# Patient Record
Sex: FEMALE | Race: BLACK OR AFRICAN AMERICAN | Employment: OTHER | URBAN - METROPOLITAN AREA
[De-identification: names, ages, dates, MRNs, and addresses within clinical notes are randomized per-mention and may not be internally consistent; named-entity substitution may affect disease eponyms.]

---

## 2018-08-26 ENCOUNTER — HOSPITAL ENCOUNTER (EMERGENCY)
Facility: CLINIC | Age: 83
Discharge: HOME | End: 2018-08-26
Attending: NURSE PRACTITIONER | Admitting: NURSE PRACTITIONER
Payer: MEDICARE

## 2018-08-26 ENCOUNTER — APPOINTMENT (OUTPATIENT)
Dept: GENERAL RADIOLOGY | Facility: CLINIC | Age: 83
End: 2018-08-26
Attending: NURSE PRACTITIONER
Payer: MEDICARE

## 2018-08-26 VITALS
RESPIRATION RATE: 16 BRPM | TEMPERATURE: 98.5 F | HEART RATE: 75 BPM | DIASTOLIC BLOOD PRESSURE: 65 MMHG | OXYGEN SATURATION: 98 % | SYSTOLIC BLOOD PRESSURE: 148 MMHG

## 2018-08-26 DIAGNOSIS — S61.012A THUMB LACERATION, LEFT, INITIAL ENCOUNTER: ICD-10-CM

## 2018-08-26 PROCEDURE — 73140 X-RAY EXAM OF FINGER(S): CPT | Mod: LT

## 2018-08-26 PROCEDURE — 99283 EMERGENCY DEPT VISIT LOW MDM: CPT

## 2018-08-26 ASSESSMENT — ENCOUNTER SYMPTOMS: WOUND: 1

## 2018-08-26 NOTE — ED PROVIDER NOTES
History     Chief Complaint:  Laceration    The history is provided by a relative. No  was used.      Chantale Mishra is a right-handed 83 year old female with a history of diabetes who presents for evaluation of a laceration to her left thumb. The patient's granddaughter, serving as interpretor, reports that the patient dropped a glass of juice yesterday morning, shattering it on the ground. As the glass broke, the patient attempted to maintain her grasp, resulting in a ~3 cm cut to the palmar aspect of her left thumb. Most recent tetanus was 2015. Patient denies other complaint.    Allergies:  No known drug allergies     Medications:    Amlodipine  Glipizide  Losartan  Prilosec    Past Medical History:    Diabetes    Past Surgical History:    The patient does not have any past pertinent medical history.     Family History:    History reviewed. No pertinent family history.      Social History:  Presents with granddaughter   Tobacco use: Not on file   Alcohol use: Not on file   PCP: Geovanni Hopkins    Marital Status:  Single     Review of Systems   Skin: Positive for wound.   All other systems reviewed and are negative.    Physical Exam     Patient Vitals for the past 24 hrs:   BP Temp Pulse Heart Rate Resp SpO2   08/26/18 1620 148/65 - - 70 16 98 %   08/26/18 1451 157/71 98.5  F (36.9  C) 75 75 16 97 %        Physical Exam  Eyes: Pupils equally round  HENT: Head is normal in appearance. Moist mucus membranes.  Cardiovascular: Normal color of mucus membranes  Respiratory: Normal respiratory effort  Musculoskeletal: No asymmetry  Skin: Normal, without rash. Left thumb palmar side 3 cm laceration   Lymphatic: No edema  Neurologic: Normal sensation. Unable to perform ROM due to pain. No exposed tendon.  Psychiatric: Normal affect.    Emergency Department Course     Procedure:  The simple superficial wound was irrigated with normal saline before Steri-Strips were applied. No local anaesthetic was  used.    Imaging:  Radiographic findings were communicated with the patient and family who voiced understanding of the findings.    XR Fingers, 2-3 views, left:  IMPRESSION: No evidence for fracture, dislocation or significant degenerative change of the left thumb. No radiopaque foreign bodies identified.    Imaging independently reviewed and agree with radiologist interpretation.      Emergency Department Course:  Past medical records, nursing notes, and vitals reviewed.  1510: I performed an exam of the patient and obtained history, as documented above.    The patient was sent for a x-ray while in the emergency department, findings above.     1600: I rechecked the patient. Steri-Strips were applied to the wound. Findings and plan explained to the Patient. Patient discharged home with instructions regarding supportive care, medications, and reasons to return. The importance of close follow-up was reviewed.      Impression & Plan      Medical Decision Making:  Chantale Mishra is a 83 year old female who presents with her granddaughter for evaluation of a laceration to her left thumb sustained by broken glass. Laceration repaired using steri strip given that this occurred yesterday morning (~20 hours ago). Tetanus status addressed this visit, is up to date as of 2015.  Discussed potential for infection including necrosis, nerve damage, infection/sepsis. Recommend have laceration rechecked in 2 days by PCP or return here if unable to obtain appointment. Instructed to watch for signs of infection including drainage, fever, swelling, pain. Should return right away. Appropriate dressing applied. Is stable to discharge home with after care instructions.     Diagnosis:    ICD-10-CM   1. Thumb laceration, left, initial encounter S61.012A       Disposition:  Discharged to home with plan as outlined.       Scribe Disclosure:  Kentrell SUE, am serving as a scribe at 3:07 PM on 8/26/2018 to document services personally  performed by Delfina Galo, LUIS MORRIS based on my observations and the provider's statements to me.   8/26/2018   [facility] EMERGENCY DEPARTMENT     Delfina Galo APRN CNP  08/26/18 1819

## 2018-08-26 NOTE — ED AVS SNAPSHOT
[facility] Emergency Department    [address]    [facility] [address]    Phone:  [phone]    Fax:  [phone]                                       Chantale Mishra   MRN: [id number]    Department:  [facility] Emergency Department   Date of Visit:  8/26/2018           After Visit Summary Signature Page     I have received my discharge instructions, and my questions have been answered. I have discussed any challenges I see with this plan with the nurse or doctor.    ..........................................................................................................................................  Patient/Patient Representative Signature      ..........................................................................................................................................  Patient Representative Print Name and Relationship to Patient    ..................................................               ................................................  Date                                            Time    ..........................................................................................................................................  Reviewed by Signature/Title    ...................................................              ..............................................  Date                                                            Time          22EPIC Rev 08/18

## 2018-08-26 NOTE — ED TRIAGE NOTES
Patient presents with complaints of laceration to left hand on thumb, which happened last night. ABC intact without need for intervention at this time.

## 2018-08-26 NOTE — ED AVS SNAPSHOT
[facility] Emergency Department    [address]    [facility] [address]    Phone:  [phone]    Fax:  [phone]                                       Chantale Mishra   MRN: [id number]    Department:  [facility] Emergency Department   Date of Visit:  8/26/2018           Patient Information     Date Of Birth          1935        Your diagnoses for this visit were:     Thumb laceration, left, initial encounter        You were seen by Delfina Galo, APRN CNP.      Follow-up Information     Follow up with Geovanni Hopkins MD In 3 days.    Specialty:  Family Practice    Contact information:    [employer]  [address]  [facility] [address] [phone]        Discharge References/Attachments     LACERATION, OLD: NOT SUTURED (ENGLISH)      24 Hour Appointment Hotline       To make an appointment at any [place] clinic, call [phone] ([phone]). If you don't have a family doctor or clinic, we will help you find one. [place] clinics are conveniently located to serve the needs of you and your family.             Review of your medicines      Our records show that you are taking the medicines listed below. If these are incorrect, please call your family doctor or clinic.        Dose / Directions Last dose taken    AMLODIPINE BESYLATE PO   Dose:  10 mg        Take 10 mg by mouth   Refills:  0        GLIPIZIDE PO   Dose:  10 mg        Take 10 mg by mouth 2 times daily (before meals)   Refills:  0        LIDOCAINE HCL SOLN 2 % (VISC) EX   Quantity:  15 ml        15 ml po now   Refills:  0        LOSARTAN POTASSIUM PO        Refills:  0        * MAALOX REGULAR STRENGTH 225-200-25 MG/5ML Susp   Quantity:  15 ml   Generic drug:  Alum & Mag Hydroxide-Simeth        15 ml po now   Refills:  0        * MAALOX REGULAR STRENGTH 225-200-25 MG/5ML Susp   Quantity:  one bottle   Generic drug:  Alum & Mag Hydroxide-Simeth        30 ml po QID prn stomach pain/acid   Refills:  1         priLOSEC 20 MG CR capsule   Quantity:  30   Generic drug:  omeprazole        ONE DAILY   Refills:  3        * Notice:  This list has 2 medication(s) that are the same as other medications prescribed for you. Read the directions carefully, and ask your doctor or other care provider to review them with you.            Procedures and tests performed during your visit     Fingers XR, 2-3 views, left      Orders Needing Specimen Collection     None      Pending Results     No orders found from 8/24/2018 to 8/27/2018.            Pending Culture Results     No orders found from 8/24/2018 to 8/27/2018.            Pending Results Instructions     If you had any lab results that were not finalized at the time of your Discharge, you can call the ED Lab Result RN at [phone]. You will be contacted by this team for any positive Lab results or changes in treatment. The nurses are available 7 days a week from 10A to 6:30P.  You can leave a message 24 hours per day and they will return your call.        Test Results From Your Hospital Stay        8/26/2018  4:02 PM      Narrative     FINGER LEFT TWO OR MORE VIEWS 8/26/2018 3:40 PM     COMPARISON: None    HISTORY: Injury, question foreign body.    FINDINGS: The visualized bones and joint spaces are within normal  limits.        Impression     IMPRESSION: No evidence for fracture, dislocation or significant  degenerative change of the left thumb. No radiopaque foreign bodies  identified.    WOLF SIMPSON MD                Clinical Quality Measure: Blood Pressure Screening     Your blood pressure was checked while you were in the emergency department today. The last reading we obtained was  BP: 157/71 . Please read the guidelines below about what these numbers mean and what you should do about them.  If your systolic blood pressure (the top number) is less than 120 and your diastolic blood pressure (the bottom number) is less than 80, then your blood pressure is normal.  "There is nothing more that you need to do about it.  If your systolic blood pressure (the top number) is 120-139 or your diastolic blood pressure (the bottom number) is 80-89, your blood pressure may be higher than it should be. You should have your blood pressure rechecked within a year by a primary care provider.  If your systolic blood pressure (the top number) is 140 or greater or your diastolic blood pressure (the bottom number) is 90 or greater, you may have high blood pressure. High blood pressure is treatable, but if left untreated over time it can put you at risk for heart attack, stroke, or kidney failure. You should have your blood pressure rechecked by a primary care provider within the next 4 weeks.  If your provider in the emergency department today gave you specific instructions to follow-up with your doctor or provider even sooner than that, you should follow that instruction and not wait for up to 4 weeks for your follow-up visit.        Thank you for choosing [facility]       Thank you for choosing [facility] for your care. Our goal is always to provide you with excellent care. Hearing back from our patients is one way we can continue to improve our services. Please take a few minutes to complete the written survey that you may receive in the mail after you visit with us. Thank you!        [employer] Information     [employer] lets you send messages to your doctor, view your test results, renew your prescriptions, schedule appointments and more. To sign up, go to [email or web address] . Click on \"Log in\" on the left side of the screen, which will take you to the Welcome page. Then click on \"Sign up Now\" on the right side of the page.     You will be asked to enter the access code listed below, as well as some personal information. Please follow the directions to create your username and password.     Your access code is: [email or web address]  Expires: 2018  4:03 PM     Your access code will  in 90 " days. If you need help or a new code, please call your [place] clinic or [phone].        Care EveryWhere ID     This is your Care EveryWhere ID. This could be used by other organizations to access your [facility] medical records  [id number]        Equal Access to Services     JEFF JANE : Destiney Gonzalez, wafrancisco javierda luqadaha, qaybta kaalmaciarra rosen, emely robb. So [facility] [phone].    ATENCIÓN: Si habla español, tiene a rothman disposición servicios gratuitos de asistencia lingüística. Llame al [phone].    We comply with applicable federal civil rights laws and [place] laws. We do not discriminate on the basis of race, color, national origin, age, disability, sex, sexual orientation, or gender identity.            After Visit Summary       This is your record. Keep this with you and show to your community pharmacist(s) and doctor(s) at your next visit.